# Patient Record
Sex: FEMALE | Race: WHITE | NOT HISPANIC OR LATINO | URBAN - METROPOLITAN AREA
[De-identification: names, ages, dates, MRNs, and addresses within clinical notes are randomized per-mention and may not be internally consistent; named-entity substitution may affect disease eponyms.]

---

## 2017-03-31 ENCOUNTER — GENERIC CONVERSION - ENCOUNTER (OUTPATIENT)
Dept: OTHER | Facility: OTHER | Age: 32
End: 2017-03-31

## 2018-01-14 NOTE — MISCELLANEOUS
History of Present Illness  TCM Communication [facility]: The patient is being contacted for follow-up after hospitalization  Hospital course was discussed with the inpatient physician  She was hospitalized at [facility]  The date of admission: 03/22/2017, date of discharge: 03/23/2017  Diagnosis: ITRACTABLE ABD PAIN/DIARRHEA/TERATOMA/  She was discharged to home  Medications were not reviewed today  She scheduled a follow up appointment  Counseling was provided to the patient  Topics counseled included importance of compliance with treatment  Communication performed and completed by AMI PORTILLO      Active Problems    1  Anxiety (300 00) (F41 9)   2  Asthma (493 90) (J45 909)   3  Counseling for birth control, intrauterine device (V25 02) (Z30 9)   4  Depression (311) (F32 9)   5  Dermoid cyst (229 9) (D36 9)   6  History of Encounter for routine checking of intrauterine contraceptive device (V25 42)   (Z30 431)   7  Fatigue (780 79) (R53 83)   8  Hypokalemia (276 8) (E87 6)   9  Migraine headache (346 90) (G43 909)   10  Sacroiliac joint dysfunction of left side (724 6) (M53 3)   11  Vitamin D deficiency (268 9) (E55 9)    Past Medical History    1  History of Age At First Period 13 Years Old (Menarche)   2  History of Age At First Pregnancy 27 Years Old   3  History of Depression with anxiety (300 4) (F41 8)   4  History of Encounter for fetal anatomic survey (V28 81) (Z36)   5  History of Encounter for routine checking of intrauterine contraceptive device (V25 42)   (Z30 431)   6  History Of 3  Previous Pregnancies (V61 5)   7  History of breast lump (V13 89) (Z87 898)   8  History of pregnancy (V13 29)   9  History of pregnancy (V13 29)   10  History of Previous Pregnancies Resulted In 2  Live Birth(S)    Surgical History    1  History of Rectal Surgery    Family History  Mother    1  Family history of Diabetes Mellitus (V18 0)   2   Family history of Migraine Headache  Maternal Grandmother    3  Family history of Bone Cancer   4  Family history of Colon Cancer (V16 0)  Maternal Grandfather    5  Family history of Prostate Cancer (V16 42)  Paternal Grandfather    10  Family history of Leukemia (V16 6)  Maternal Aunt    7  Family history of Breast Cancer (V16 3)   8  Family history of Depression  Paternal Aunt    5  Family history of Depression  Maternal Uncle    10  Family history of Depression   11  Family history of Esophageal Cancer (V16 0)   12  Family history of Esophageal Cancer (V16 0)   13  Family history of Multiple Sclerosis  Paternal Uncle    15  Family history of Lung Cancer (V16 1)    Social History    · Being A Social Drinker   · Current Every Day Smoker (305 1)    Current Meds   1  ClonazePAM 0 5 MG Oral Tablet; 1/2-1 TAB Q 12 HOURS AS NEEDED FOR ANXIETY OR   PANIC-NO DRIVING OR ALCOHOL WITH THIS;   Therapy: [id number] to (Evaluate:08Apr2017); Last Rx:[id number] Ordered   2  Multivitamin TABS; TAKE 1 TABLET DAILY; Therapy: (Recorded:02Apr2015) to Recorded   3  Vitamin D-3 1000 UNIT Oral Capsule; take 1 capsule daily; Therapy: (Recorded:02Apr2015) to Recorded    Allergies    1  No Known Drug Allergies    2  No Known Environmental Allergies    Signatures   Electronically signed by : Jennifer Lewis DO;  Apr 6 2017 11:11AM EST                       (Author)